# Patient Record
Sex: MALE | Race: WHITE | NOT HISPANIC OR LATINO | Employment: FULL TIME | URBAN - METROPOLITAN AREA
[De-identification: names, ages, dates, MRNs, and addresses within clinical notes are randomized per-mention and may not be internally consistent; named-entity substitution may affect disease eponyms.]

---

## 2018-01-07 ENCOUNTER — APPOINTMENT (OUTPATIENT)
Dept: GENERAL RADIOLOGY | Facility: HOSPITAL | Age: 23
End: 2018-01-07

## 2018-01-07 ENCOUNTER — HOSPITAL ENCOUNTER (EMERGENCY)
Facility: HOSPITAL | Age: 23
Discharge: HOME | End: 2018-01-07
Attending: EMERGENCY MEDICINE | Admitting: EMERGENCY MEDICINE

## 2018-01-07 VITALS
HEIGHT: 70 IN | BODY MASS INDEX: 28.63 KG/M2 | RESPIRATION RATE: 16 BRPM | TEMPERATURE: 98.5 F | DIASTOLIC BLOOD PRESSURE: 75 MMHG | SYSTOLIC BLOOD PRESSURE: 132 MMHG | HEART RATE: 90 BPM | OXYGEN SATURATION: 99 % | WEIGHT: 200 LBS

## 2018-01-07 DIAGNOSIS — S93.402A SPRAIN OF LEFT ANKLE, UNSPECIFIED LIGAMENT, INITIAL ENCOUNTER: Primary | ICD-10-CM

## 2018-01-07 PROCEDURE — 99283 EMERGENCY DEPT VISIT LOW MDM: CPT

## 2018-01-07 PROCEDURE — 73610 X-RAY EXAM OF ANKLE: CPT

## 2018-01-07 PROCEDURE — 73590 X-RAY EXAM OF LOWER LEG: CPT

## 2018-01-07 RX ORDER — IBUPROFEN 600 MG/1
600 TABLET ORAL EVERY 6 HOURS PRN
Qty: 24 TABLET | Refills: 0 | Status: SHIPPED | OUTPATIENT
Start: 2018-01-07

## 2018-01-07 RX ORDER — IBUPROFEN 800 MG/1
800 TABLET ORAL ONCE
Status: COMPLETED | OUTPATIENT
Start: 2018-01-07 | End: 2018-01-07

## 2018-01-07 RX ADMIN — IBUPROFEN 800 MG: 800 TABLET ORAL at 13:41

## 2018-01-07 NOTE — ED NOTES
Patient was jumping on a trampoline at [employer] and her rolled his left ankle. Today he is unable to put any weight on the left foot without pain and ankle is more swollen. Patient has positive pedal pulse and can move toes fine and brisk cap refill to left foot. Denies numbness or tingling.      Rosi Goldsmith RN  01/07/18 7400

## 2018-01-07 NOTE — ED PROVIDER NOTES
" EMERGENCY DEPARTMENT ENCOUNTER    CHIEF COMPLAINT  Chief Complaint: Left ankle injury  History given by: Patient  History limited by: Nothing  Room Number: 52/52  PMD: No Known Provider      HPI:  Pt is a 22 y.o. male who presents complaining of left ankle injury onset last night. The pt states he was jumping on a trampoline last night when he rolled his left ankle inwards. The pt states he has had pain since the injury occurred. The pt states he has been unable to bear weight due to the pain. The pt states he took Aleve at 2200 last night with no improvement of his pain.      Duration: Since the injury occurred last night  Onset: Sudden  Timing: Constant  Location: Left ankle  Radiation: None  Quality: \"Pain\"  Intensity/Severity: Moderate  Progression: Unchanged  Associated Symptoms: None stated  Aggravating Factors: Bearing weight  Alleviating Factors: None stated  Previous Episodes: None  Treatment before arrival: The pt states he took Aleve at 2200 last night with no improvement of his pain.    PAST MEDICAL HISTORY  Active Ambulatory Problems     Diagnosis Date Noted   • No Active Ambulatory Problems     Resolved Ambulatory Problems     Diagnosis Date Noted   • No Resolved Ambulatory Problems     No Additional Past Medical History       PAST SURGICAL HISTORY  History reviewed. No pertinent surgical history.    FAMILY HISTORY  History reviewed. No pertinent family history.    SOCIAL HISTORY  Social History     Social History   • Marital status: Single     Spouse name: N/A   • Number of children: N/A   • Years of education: N/A     Occupational History   • Not on file.     Social History Main Topics   • Smoking status: Never Smoker   • Smokeless tobacco: Never Used   • Alcohol use No      Comment: occ   • Drug use: Not on file   • Sexual activity: Not on file     Other Topics Concern   • Not on file     Social History Narrative   • No narrative on file       ALLERGIES  Review of patient's allergies indicates no " known allergies.    REVIEW OF SYSTEMS  Review of Systems   Constitutional: Negative for chills and fever.   Respiratory: Negative for cough and shortness of breath.    Cardiovascular: Negative for chest pain and palpitations.   Gastrointestinal: Negative for abdominal pain and vomiting.   Genitourinary: Negative for difficulty urinating and dysuria.   Musculoskeletal: Positive for arthralgias (Left ankle). Negative for back pain.   Neurological: Negative for syncope, weakness and numbness.   All other systems reviewed and are negative.      PHYSICAL EXAM  ED Triage Vitals   Temp Heart Rate Resp BP SpO2   01/07/18 1216 01/07/18 1203 01/07/18 1203 01/07/18 1216 01/07/18 1203   98.5 °F (36.9 °C) 84 16 132/75 99 %      Temp src Heart Rate Source Patient Position BP Location FiO2 (%)   01/07/18 1216 01/07/18 1203 -- -- --   Oral Monitor          Physical Exam   Constitutional: He is oriented to person, place, and time. He appears distressed.   HENT:   Head: Normocephalic and atraumatic.   Eyes: EOM are normal.   Neck: Normal range of motion.   Cardiovascular: Normal rate, regular rhythm and normal heart sounds.    No murmur heard.  Pulses:       Dorsalis pedis pulses are 2+ on the right side, and 2+ on the left side.        Posterior tibial pulses are 2+ on the right side, and 2+ on the left side.   Pulmonary/Chest: Effort normal and breath sounds normal. No respiratory distress. He has no wheezes.   Abdominal: Soft. Bowel sounds are normal. There is no tenderness. There is no rebound and no guarding.   Musculoskeletal: Normal range of motion. He exhibits no edema.        Left ankle: He exhibits swelling and ecchymosis. He exhibits no deformity, no laceration and normal pulse. Tenderness. Lateral malleolus and medial malleolus tenderness found. No head of 5th metatarsal and no proximal fibula tenderness found. Achilles tendon exhibits no pain and no defect.   The pt has no proximal fibular tenderness. The pt's achilles  is intact. The pt has a negative anterior drawer test.   Neurological: He is alert and oriented to person, place, and time.   The pt has normal sensation to the distal left foot.   Skin: Skin is warm and dry.   Psychiatric: Affect normal.   Nursing note and vitals reviewed.      RADIOLOGY  XR Ankle 3+ View Left - Negative for fx   XR Tibia Fibula 2 View Left - Negative for fx        I ordered the above noted radiological studies. Interpreted by radiologist. Reviewed by me in PACS.       PROCEDURES  Procedures      PROGRESS AND CONSULTS  ED Course     1221  XR Left Ankle and XR Left Tib Fib ordered for further evaluation.    1310  Upon initial encounter, I informed the pt that I was unable to view a fx on the pt's XR. I informed the pt that I am going to wait for the official radiology dictation to make sure the radiologist does not view a fx. Discussed the plan to discharge the pt. I advised the pt to use elevation and ice to help with the pain. I advised the pt to ice the ankle at least 3 times a day. I informed the pt that his pain should improve over the next several weeks, and if it does not, he will need to f/u to get additional imaging. The pt understands and agrees with the plan.      MEDICAL DECISION MAKING  Results were reviewed/discussed with the patient and they were also made aware of online access. Pt also made aware that some labs, such as cultures, will not be resulted during ER visit and follow up with PMD is necessary.     MDM  Number of Diagnoses or Management Options  Sprain of left ankle, unspecified ligament, initial encounter:      Amount and/or Complexity of Data Reviewed  Tests in the radiology section of CPT®: ordered and reviewed (XR Ankle 3+ View Left - Negative for fx  XR Tibia Fibula 2 View Left - Negative for fx  )    Patient Progress  Patient progress: stable         DIAGNOSIS  Final diagnoses:   Sprain of left ankle, unspecified ligament, initial encounter        DISPOSITION  DISCHARGE    Patient discharged in stable condition.    Reviewed implications of results, diagnosis, meds, responsibility to follow up, warning signs and symptoms of possible worsening, potential complications and reasons to return to ER.    Patient/Family voiced understanding of above instructions.    Discussed plan for discharge, as there is no emergent indication for admission.  Pt/family is agreeable and understands need for follow up and repeat testing.  Pt is aware that discharge does not mean that nothing is wrong but it indicates no emergency is present that requires admission and they must continue care with follow-up as given below or physician of their choice.     FOLLOW-UP  Percy Cade MD  [address] [id number] [phone]    Schedule an appointment as soon as possible for a visit in 2 weeks  As needed         Medication List      New Prescriptions          ibuprofen 600 MG tablet   Commonly known as:  ADVIL,MOTRIN   Take 1 tablet by mouth Every 6 (Six) Hours As Needed for Mild Pain  or   Moderate Pain  (take with food).         Stop          cyclobenzaprine 10 MG tablet   Commonly known as:  FLEXERIL               Latest Documented Vital Signs:  As of 1:31 PM  BP- 132/75 HR- 90 Temp- 98.5 °F (36.9 °C) (Oral) O2 sat- 99%    --  Documentation assistance provided by kai Moore for Dr. Aiken.  Information recorded by the scribe was done at my direction and has been verified and validated by me.       Jesse Mooer  01/07/18 1330       Cristine Aiken MD  01/09/18 [id number]

## 2018-01-07 NOTE — DISCHARGE INSTRUCTIONS
You are advised to follow closely with Dr. Cade or orthopedist of your choice for persistent ankle pain not rapidly improving in 2 weeks for recheck, final results of imaging testing, and further testing/treatment as needed.      Ice, elevate left ankle at least 3 times daily especially over the next 2 days.  Use crutches as needed.  Weightbearing as tolerated.  Use air cast as needed for comfort    Please return to the emergency department immediately with chest pain different than usual for you, shortness of air, abdominal pain, persistent vomiting/fever, blood in emesis or stool, lightheadedness/fainting, problems with speech, one sided weakness/numbness, new incontinence, problems with vision,  or for worsening of symptoms or other concerns.

## 2018-01-12 ENCOUNTER — TELEPHONE (OUTPATIENT)
Dept: ORTHOPEDIC SURGERY | Facility: CLINIC | Age: 23
End: 2018-01-12

## 2018-01-15 ENCOUNTER — OFFICE VISIT (OUTPATIENT)
Dept: ORTHOPEDIC SURGERY | Facility: CLINIC | Age: 23
End: 2018-01-15

## 2018-01-15 VITALS — HEIGHT: 72 IN | WEIGHT: 199.4 LBS | TEMPERATURE: 99.2 F | BODY MASS INDEX: 27.01 KG/M2

## 2018-01-15 DIAGNOSIS — S93.402A SPRAIN OF LEFT ANKLE, UNSPECIFIED LIGAMENT, INITIAL ENCOUNTER: ICD-10-CM

## 2018-01-15 DIAGNOSIS — S86.302A INJURY OF PERONEAL TENDON OF LEFT FOOT, INITIAL ENCOUNTER: Primary | ICD-10-CM

## 2018-01-15 PROCEDURE — 99204 OFFICE O/P NEW MOD 45 MIN: CPT | Performed by: ORTHOPAEDIC SURGERY

## 2018-01-15 NOTE — PROGRESS NOTES
New Patient Complaint      Patient: Donovan Mitchell  YOB: 1995 22 y.o. male  Medical Record Number: [id number]    Chief Complaints: I hurt my ankle     History of Present Illness:   Patient injured his left ankle at a trampoline facility on 1/6/18 twisted inverting the ankle.  He had some sharp pops in the left ankle with medial onset of pain swelling and some bruising with difficulty bearing weight.  He was seen in the emergency room the next day where x-rays were made he was told there was no fracture is placed into an air stirrup and provided with crutches.  He has since stopped using the crutches and is using some type of neoprene ankle wrap.  He still complains of moderate constant stabbing aching pain with bruising and swelling over the inferomedial and dorsal lateral aspects and posterior lateral aspects left ankle worse with walking and standing improved some with anti-inflammatories ice and rest.  Prior to this he had no trouble with the ankle.  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      HPI    Allergies: No Known Allergies    Medications:   Current Outpatient Prescriptions on File Prior to Visit   Medication Sig   • ibuprofen (ADVIL,MOTRIN) 600 MG tablet Take 1 tablet by mouth Every 6 (Six) Hours As Needed for Mild Pain  or Moderate Pain  (take with food).   • cyclobenzaprine (FLEXERIL) 10 MG tablet Take 1 tablet by mouth 3 (Three) Times a Day As Needed for Muscle Spasms.     No current facility-administered medications on file prior to visit.        History reviewed. No pertinent past medical history.  History reviewed. No pertinent surgical history.  Social History     Occupational History   • Not on file.     Social History Main Topics   • Smoking status: Never Smoker   • Smokeless tobacco: Never Used   • Alcohol use No      Comment: occ   • Drug use: No   • Sexual activity: Defer      Social History     Social History Narrative     History reviewed. No pertinent family  "history.    Review of Systems: 14 point review of systems performed, positive pertinent findings identified in HPI. All remaining systems negative     Review of Systems      Physical Exam:   Vitals:    01/15/18 1145   Temp: 99.2 °F (37.3 °C)   TempSrc: Temporal Artery    Weight: 90.4 kg (199 lb 6.4 oz)   Height: 181.6 cm (71.5\")     Physical Exam   Constitutional: pleasant, well developed   Eyes: sclera non icteric  Hearing : adequate for exam  Cardiovascular: palpable pulses in left foot, left calf/ thigh NT without sign of DVT  Respiratoy: breathing unlabored   Neurological: grossly sensate to LT throughout left LE  Psychiatric: oriented with normal mood and affect.   Lymphatic: No palpable popliteal lymphadenopathy left LE  Skin: intact throughout left leg/foot  Musculoskeletal: Moderate swelling about the left ankle.  He was tender to palpation of the medial malleolus and deltoid as well as along the posterior tib tendon.  There is discomfort over the anterolateral ligamentous structures and along the peroneal tendon.  There was discomfort with resisted eversion but no gross subluxation but he was somewhat weak.  There was minimal if any discomfort over the syndesmosis with external rotation testing and no focal pain with proximal fibular compression testing except over the medial aspect of the ankle.  He was nontender over the dorsum of the midfoot he was able to flex and extend his toes well.  Cannot perform a good stability exam due to guarding and swelling.  Physical Exam  Ortho Exam    Radiology: 3 views left ankle and 2 views of the left tib-fib reviewed on the [facility] system from 1/7/18.  Did not see any obvious fracture but there was a questionable area over the posterior malleolus on one view.  Overall talus appears well seated within the mortise    Assessment/Plan: 1.  Left ankle injury with possible posterior malleolar fracture, possible peroneal tendon injury or medial ligamentous injury.    He was " fitted with a tall boot today he will do partial weightbearing only with crutches.  He may sleep in his air stirrup.  Encouraged gentle range of motion exercises to the ankle.    We'll keep him off work for now.  We need to get an MRI of his left ankle to evaluate for posterior malleolar fracture or peroneal tendon injury.    He understands to call to schedule follow-up appointment once MRI has been scheduled.

## 2025-03-22 ENCOUNTER — NURSE TRIAGE (OUTPATIENT)
Dept: CALL CENTER | Facility: HOSPITAL | Age: 30
End: 2025-03-22
Payer: COMMERCIAL

## 2025-03-22 ENCOUNTER — APPOINTMENT (OUTPATIENT)
Dept: ULTRASOUND IMAGING | Facility: HOSPITAL | Age: 30
End: 2025-03-22
Payer: COMMERCIAL

## 2025-03-22 ENCOUNTER — APPOINTMENT (OUTPATIENT)
Dept: CT IMAGING | Facility: HOSPITAL | Age: 30
End: 2025-03-22
Payer: COMMERCIAL

## 2025-03-22 ENCOUNTER — HOSPITAL ENCOUNTER (EMERGENCY)
Facility: HOSPITAL | Age: 30
Discharge: HOME | End: 2025-03-22
Attending: EMERGENCY MEDICINE
Payer: COMMERCIAL

## 2025-03-22 VITALS
SYSTOLIC BLOOD PRESSURE: 128 MMHG | DIASTOLIC BLOOD PRESSURE: 77 MMHG | RESPIRATION RATE: 16 BRPM | OXYGEN SATURATION: 98 % | TEMPERATURE: 99.9 F | HEART RATE: 93 BPM

## 2025-03-22 DIAGNOSIS — N49.2 SCROTAL INFECTION: ICD-10-CM

## 2025-03-22 DIAGNOSIS — Z09 FOLLOW-UP EXAM: Primary | ICD-10-CM

## 2025-03-22 LAB
ALBUMIN SERPL-MCNC: 4.4 G/DL (ref 3.5–5.2)
ALBUMIN/GLOB SERPL: 1.5 G/DL
ALP SERPL-CCNC: 61 U/L (ref 39–117)
ALT SERPL W P-5'-P-CCNC: 20 U/L (ref 1–41)
ANION GAP SERPL CALCULATED.3IONS-SCNC: 12.8 MMOL/L (ref 5–15)
AST SERPL-CCNC: 22 U/L (ref 1–40)
BILIRUB SERPL-MCNC: 0.5 MG/DL (ref 0–1.2)
BILIRUB UR QL STRIP: NEGATIVE
BUN SERPL-MCNC: 16 MG/DL (ref 6–20)
BUN/CREAT SERPL: 13.2 (ref 7–25)
C TRACH RRNA CVX QL NAA+PROBE: NOT DETECTED
CALCIUM SPEC-SCNC: 9.2 MG/DL (ref 8.6–10.5)
CHLORIDE SERPL-SCNC: 101 MMOL/L (ref 98–107)
CLARITY UR: ABNORMAL
CO2 SERPL-SCNC: 23.2 MMOL/L (ref 22–29)
COLOR UR: YELLOW
CREAT SERPL-MCNC: 1.21 MG/DL (ref 0.76–1.27)
D-LACTATE SERPL-SCNC: 1.1 MMOL/L (ref 0.5–2)
DEPRECATED RDW RBC AUTO: 41.2 FL (ref 37–54)
EGFRCR SERPLBLD CKD-EPI 2021: 83.6 ML/MIN/1.73
ERYTHROCYTE [DISTWIDTH] IN BLOOD BY AUTOMATED COUNT: 12.3 % (ref 12.3–15.4)
GLOBULIN UR ELPH-MCNC: 2.9 GM/DL
GLUCOSE SERPL-MCNC: 124 MG/DL (ref 65–99)
GLUCOSE UR STRIP-MCNC: NEGATIVE MG/DL
HCT VFR BLD AUTO: 42 % (ref 37.5–51)
HGB BLD-MCNC: 13.9 G/DL (ref 13–17.7)
HGB UR QL STRIP.AUTO: NEGATIVE
INR PPP: 1.3 (ref 0.9–1.1)
KETONES UR QL STRIP: ABNORMAL
LEUKOCYTE ESTERASE UR QL STRIP.AUTO: NEGATIVE
LYMPHOCYTES # BLD MANUAL: 1.03 10*3/MM3 (ref 0.7–3.1)
LYMPHOCYTES NFR BLD MANUAL: 5 % (ref 5–12)
MAGNESIUM SERPL-MCNC: 1.9 MG/DL (ref 1.6–2.6)
MCH RBC QN AUTO: 30.5 PG (ref 26.6–33)
MCHC RBC AUTO-ENTMCNC: 33.1 G/DL (ref 31.5–35.7)
MCV RBC AUTO: 92.1 FL (ref 79–97)
MONOCYTES # BLD: 0.47 10*3/MM3 (ref 0.1–0.9)
N GONORRHOEA RRNA SPEC QL NAA+PROBE: NOT DETECTED
NEUTROPHILS # BLD AUTO: 7.85 10*3/MM3 (ref 1.7–7)
NEUTROPHILS NFR BLD MANUAL: 84 % (ref 42.7–76)
NITRITE UR QL STRIP: NEGATIVE
PH UR STRIP.AUTO: 7.5 [PH] (ref 5–8)
PLAT MORPH BLD: NORMAL
PLATELET # BLD AUTO: 211 10*3/MM3 (ref 140–450)
PMV BLD AUTO: 10.3 FL (ref 6–12)
POTASSIUM SERPL-SCNC: 3.8 MMOL/L (ref 3.5–5.2)
PROCALCITONIN SERPL-MCNC: 0.25 NG/ML (ref 0–0.25)
PROT SERPL-MCNC: 7.3 G/DL (ref 6–8.5)
PROT UR QL STRIP: ABNORMAL
PROTHROMBIN TIME: 16.2 SECONDS (ref 11.7–14.2)
RBC # BLD AUTO: 4.56 10*6/MM3 (ref 4.14–5.8)
RBC MORPH BLD: NORMAL
SODIUM SERPL-SCNC: 137 MMOL/L (ref 136–145)
SP GR UR STRIP: 1.02 (ref 1–1.03)
UROBILINOGEN UR QL STRIP: ABNORMAL
VARIANT LYMPHS NFR BLD MANUAL: 11 % (ref 19.6–45.3)
WBC MORPH BLD: NORMAL
WBC NRBC COR # BLD AUTO: 9.35 10*3/MM3 (ref 3.4–10.8)

## 2025-03-22 PROCEDURE — 87491 CHLMYD TRACH DNA AMP PROBE: CPT | Performed by: EMERGENCY MEDICINE

## 2025-03-22 PROCEDURE — 80053 COMPREHEN METABOLIC PANEL: CPT | Performed by: EMERGENCY MEDICINE

## 2025-03-22 PROCEDURE — 72193 CT PELVIS W/DYE: CPT

## 2025-03-22 PROCEDURE — 76870 US EXAM SCROTUM: CPT

## 2025-03-22 PROCEDURE — 36415 COLL VENOUS BLD VENIPUNCTURE: CPT

## 2025-03-22 PROCEDURE — 84145 PROCALCITONIN (PCT): CPT | Performed by: EMERGENCY MEDICINE

## 2025-03-22 PROCEDURE — 83735 ASSAY OF MAGNESIUM: CPT | Performed by: EMERGENCY MEDICINE

## 2025-03-22 PROCEDURE — 25510000001 IOPAMIDOL 61 % SOLUTION: Performed by: EMERGENCY MEDICINE

## 2025-03-22 PROCEDURE — 99285 EMERGENCY DEPT VISIT HI MDM: CPT

## 2025-03-22 PROCEDURE — 85025 COMPLETE CBC W/AUTO DIFF WBC: CPT | Performed by: EMERGENCY MEDICINE

## 2025-03-22 PROCEDURE — 25010000002 CEFTRIAXONE PER 250 MG: Performed by: EMERGENCY MEDICINE

## 2025-03-22 PROCEDURE — 93976 VASCULAR STUDY: CPT

## 2025-03-22 PROCEDURE — 87040 BLOOD CULTURE FOR BACTERIA: CPT | Performed by: EMERGENCY MEDICINE

## 2025-03-22 PROCEDURE — 85610 PROTHROMBIN TIME: CPT | Performed by: EMERGENCY MEDICINE

## 2025-03-22 PROCEDURE — 87591 N.GONORRHOEAE DNA AMP PROB: CPT | Performed by: EMERGENCY MEDICINE

## 2025-03-22 PROCEDURE — 81003 URINALYSIS AUTO W/O SCOPE: CPT | Performed by: EMERGENCY MEDICINE

## 2025-03-22 PROCEDURE — 85007 BL SMEAR W/DIFF WBC COUNT: CPT | Performed by: EMERGENCY MEDICINE

## 2025-03-22 PROCEDURE — 83605 ASSAY OF LACTIC ACID: CPT | Performed by: EMERGENCY MEDICINE

## 2025-03-22 PROCEDURE — 96365 THER/PROPH/DIAG IV INF INIT: CPT

## 2025-03-22 RX ORDER — SODIUM CHLORIDE 0.9 % (FLUSH) 0.9 %
10 SYRINGE (ML) INJECTION AS NEEDED
Status: DISCONTINUED | OUTPATIENT
Start: 2025-03-22 | End: 2025-03-22 | Stop reason: HOSPADM

## 2025-03-22 RX ORDER — IOPAMIDOL 612 MG/ML
85 INJECTION, SOLUTION INTRAVASCULAR
Status: COMPLETED | OUTPATIENT
Start: 2025-03-22 | End: 2025-03-22

## 2025-03-22 RX ADMIN — CEFTRIAXONE 2000 MG: 2 INJECTION, POWDER, FOR SOLUTION INTRAMUSCULAR; INTRAVENOUS at 12:56

## 2025-03-22 RX ADMIN — IOPAMIDOL 85 ML: 612 INJECTION, SOLUTION INTRAVENOUS at 13:06

## 2025-03-22 NOTE — ED PROVIDER NOTES
EMERGENCY DEPARTMENT ENCOUNTER    Room Number:  29/29  Date of encounter:  3/22/2025  PCP: Provider, No Known  Historian: Patient  Relevant information and history provided by sources other than the patient will be included below and in the ED Course.  Review of pertinent past medical records may also be included in record below and ED Course.    HPI:  Chief Complaint: Testicular swelling and tenderness  A complete HPI/ROS/PMH/PSH/SH/FH are unobtainable due to: Not applicable  Context: Donovan Mitchell is a 29 y.o. male who presents to the ED c/o patient has noticed since yesterday some swelling and discomfort to his testicle mainly on the left side of his testicle he noticed some discomfort.  But his scrotum seems swollen.  He is monogamous and has been monogamous with 1 person for 5 years.  Denies any dysuria denies any urethral discharge.  He has had a history of varicose veins that was diagnosed about 10 years ago.  No history of similar episodes like this in the past.  He felt like he had some subjective fevers and chills last night.  Denies any coughs or colds or runny nose or nasal drainage.  Denies any abdominal pain or flank pain.  Denies chest pain or shortness of breath.  He is not immunocompromise.        Previous Episodes: No  Current Symptoms: See above    MEDICAL HISTORY REVIEWED  Healthy male.  No chronic medical problems.      PAST MEDICAL HISTORY  Active Ambulatory Problems     Diagnosis Date Noted    Injury of peroneal tendon of left foot 01/15/2018    Sprain of left ankle 01/15/2018     Resolved Ambulatory Problems     Diagnosis Date Noted    No Resolved Ambulatory Problems     No Additional Past Medical History         PAST SURGICAL HISTORY  No past surgical history on file.      FAMILY HISTORY  Family History   Problem Relation Age of Onset    No Known Problems Mother     No Known Problems Father     Diabetes Maternal Grandmother          SOCIAL HISTORY  Social History     Socioeconomic History     Marital status: Single   Tobacco Use    Smoking status: Never    Smokeless tobacco: Never   Substance and Sexual Activity    Alcohol use: No     Comment: occ    Drug use: No    Sexual activity: Defer         ALLERGIES  Patient has no known allergies.        REVIEW OF SYSTEMS  Review of Systems     All systems reviewed and negative except for those discussed in HPI.       PHYSICAL EXAM    I have reviewed the triage vital signs and nursing notes.    ED Triage Vitals   Temp Heart Rate Resp BP SpO2   03/22/25 0941 03/22/25 0941 03/22/25 0941 03/22/25 0943 03/22/25 0941   99.9 °F (37.7 °C) (!) 131 16 149/73 100 %      Temp src Heart Rate Source Patient Position BP Location FiO2 (%)   -- -- -- -- --              GENERAL: Young male.  Does not appear septic or toxic.  No acute distress.Vital signs on my initial evaluation have been reviewed.  He has a normal heart rate oxygen saturation 100% on room air he is afebrile here.  Which was 99.9 °F  HENT: nares patent  Head/neck/ face are symmetric without gross deformity, signs of trauma, or swelling  EYES: no scleral icterus, no conjunctival pallor.  NECK: Supple, no meningismus  CV: regular rhythm, regular rate with intact distal pulses.  RESPIRATORY: normal effort and no respiratory distress.  ABDOMEN: soft and nontender.  GENITOURINARY: Normal phallus. Bilaterally descended testes without masses.  No hernia.    His scrotum appears diffusely swollen mildly.  Does have some mild erythema has some tenderness to his left testicle.  There is no urethral discharge.  There is no fluctuance.  This patient does  shave his scrotum as well as genitalia.  No urethral discharge.       MUSCULOSKELETAL: no deformity.  Distal pulses that are equal strong and symmetric.  No rash.  No edema.  NEURO: alert and appropriate, moves all extremities, follows commands.  No focal motor or sensory changes  SKIN: warm, dry    Vital signs and nursing notes reviewed.        LAB RESULTS  Recent  Results (from the past 24 hours)   Comprehensive Metabolic Panel    Collection Time: 03/22/25 10:07 AM    Specimen: Blood   Result Value Ref Range    Glucose 124 (H) 65 - 99 mg/dL    BUN 16 6 - 20 mg/dL    Creatinine 1.21 0.76 - 1.27 mg/dL    Sodium 137 136 - 145 mmol/L    Potassium 3.8 3.5 - 5.2 mmol/L    Chloride 101 98 - 107 mmol/L    CO2 23.2 22.0 - 29.0 mmol/L    Calcium 9.2 8.6 - 10.5 mg/dL    Total Protein 7.3 6.0 - 8.5 g/dL    Albumin 4.4 3.5 - 5.2 g/dL    ALT (SGPT) 20 1 - 41 U/L    AST (SGOT) 22 1 - 40 U/L    Alkaline Phosphatase 61 39 - 117 U/L    Total Bilirubin 0.5 0.0 - 1.2 mg/dL    Globulin 2.9 gm/dL    A/G Ratio 1.5 g/dL    BUN/Creatinine Ratio 13.2 7.0 - 25.0    Anion Gap 12.8 5.0 - 15.0 mmol/L    eGFR 83.6 >60.0 mL/min/1.73   Protime-INR    Collection Time: 03/22/25 10:07 AM    Specimen: Blood   Result Value Ref Range    Protime 16.2 (H) 11.7 - 14.2 Seconds    INR 1.30 (H) 0.90 - 1.10   Procalcitonin    Collection Time: 03/22/25 10:07 AM    Specimen: Blood   Result Value Ref Range    Procalcitonin 0.25 0.00 - 0.25 ng/mL   Lactic Acid, Plasma    Collection Time: 03/22/25 10:07 AM    Specimen: Blood   Result Value Ref Range    Lactate 1.1 0.5 - 2.0 mmol/L   Magnesium    Collection Time: 03/22/25 10:07 AM    Specimen: Blood   Result Value Ref Range    Magnesium 1.9 1.6 - 2.6 mg/dL   CBC Auto Differential    Collection Time: 03/22/25 10:07 AM    Specimen: Blood   Result Value Ref Range    WBC 9.35 3.40 - 10.80 10*3/mm3    RBC 4.56 4.14 - 5.80 10*6/mm3    Hemoglobin 13.9 13.0 - 17.7 g/dL    Hematocrit 42.0 37.5 - 51.0 %    MCV 92.1 79.0 - 97.0 fL    MCH 30.5 26.6 - 33.0 pg    MCHC 33.1 31.5 - 35.7 g/dL    RDW 12.3 12.3 - 15.4 %    RDW-SD 41.2 37.0 - 54.0 fl    MPV 10.3 6.0 - 12.0 fL    Platelets 211 140 - 450 10*3/mm3   Manual Differential    Collection Time: 03/22/25 10:07 AM    Specimen: Blood   Result Value Ref Range    Neutrophil % 84.0 (H) 42.7 - 76.0 %    Lymphocyte % 11.0 (L) 19.6 - 45.3 %     Monocyte % 5.0 5.0 - 12.0 %    Neutrophils Absolute 7.85 (H) 1.70 - 7.00 10*3/mm3    Lymphocytes Absolute 1.03 0.70 - 3.10 10*3/mm3    Monocytes Absolute 0.47 0.10 - 0.90 10*3/mm3    RBC Morphology Normal Normal    WBC Morphology Normal Normal    Platelet Morphology Normal Normal   Urinalysis With Microscopic If Indicated (No Culture) - Urine, Clean Catch    Collection Time: 03/22/25 11:09 AM    Specimen: Urine, Clean Catch   Result Value Ref Range    Color, UA Yellow Yellow, Straw    Appearance, UA Cloudy (A) Clear    pH, UA 7.5 5.0 - 8.0    Specific Gravity, UA 1.023 1.005 - 1.030    Glucose, UA Negative Negative    Ketones, UA Trace (A) Negative    Bilirubin, UA Negative Negative    Blood, UA Negative Negative    Protein, UA Trace (A) Negative    Leuk Esterase, UA Negative Negative    Nitrite, UA Negative Negative    Urobilinogen, UA 1.0 E.U./dL 0.2 - 1.0 E.U./dL   Chlamydia trachomatis, Neisseria gonorrhoeae, PCR - Swab, Urine, Clean Catch    Collection Time: 03/22/25 11:44 AM    Specimen: Urine, Clean Catch; Swab   Result Value Ref Range    Chlamydia DNA by PCR Not Detected Not Detected    Neisseria gonorrhoeae by PCR Not Detected Not Detected       Ordered the above labs and independently reviewed the results.        RADIOLOGY  CT Pelvis With Contrast  Result Date: 3/22/2025  CT PELVIS W CONTRAST-  HISTORY: Assess for scrotal abscess. Likely scrotal cellulitis.  TECHNIQUE: Radiation dose reduction techniques were utilized, including automated exposure control and exposure modulation based on body size. 3 mm images were obtained through the pelvis after the administration of IV contrast.  COMPARISON: Same-day testicular ultrasound.   FINDINGS: Scrotal skin thickening and small bilateral hydroceles (series 2/image 97). No appreciable organized fluid collection or soft tissue air/gas. Mildly enlarged bilateral inguinal lymph nodes measuring up to 1 cm bilaterally (series 2/image 63).  Normal bladder, prostate and  visualized bowel. Normal nondilated appendix (series 3/image 38). No focal osseous abnormality.        Scrotal skin thickening and small bilateral hydroceles. No appreciable organized fluid collection or soft tissue air/gas. Recommend correlation for findings to suggest infection. Mildly enlarged bilateral lymph nodes are likely reactive/inflammatory.  This report was finalized on 3/22/2025 2:55 PM by Dr. Nahid Jimenez M.D on Workstation: [id number]      US Scrotum & Testicles with doppler  Result Date: 3/22/2025  SCROTAL SONOGRAM WITH SPECTRAL DOPPLER EVALUATION  COMPARISON:  None  HISTORY: Testicular swelling and discomfort with left-sided predominance  TECHNIQUE: Grayscale, color and spectral Doppler images of the testicles were obtained.  FINDINGS:   The right testicle measures 4.3 x 2.3 x 2.6 cm.  The left testicle measures 3.7 x 2.8 x 2.3 cm.   The testicular parenchyma has a normal echotexture.  The epididymides are unremarkable.  Normal arterial and venous waveforms are present in the testes.  Small right-sided hydrocele.  Bilateral scrotal skin thickening measuring up to 0.6 cm bilaterally with mildly increased color Doppler flow.  Dilation of the pampiniform plexus of veins on the left with Valsalva measuring up to 4 mm.  Left inguinal lymph nodes measuring up to 1.1 cm in short axis. Right inguinal lymph nodes measuring up to 1 cm in short axis with remeasurement. Associated lymph nodes have some cortical thickening.        1. Normal sonographic appearance of the testes. 2. Bilateral scrotal skin thickening with mildly increased flow. Recommend correlation with exam to evaluate for findings to suggest soft tissue infection. 3. Left-sided varicocele. 4. Small right-sided hydrocele. 5. Mildly enlarged bilateral inguinal lymph nodes. Recommend a follow-up CT abdomen pelvis in 3 to 6 months to ensure stability or resolution.   This report was finalized on 3/22/2025 12:22 PM by Dr. Nahid Jimenez M.D on  Workstation: [id number]        I ordered the above noted radiological studies. Reviewed by me and discussed with radiologist.  See dictation for official radiology interpretation.      PROCEDURES    Procedures      MEDICATIONS GIVEN IN ER    Medications   sodium chloride 0.9 % flush 10 mL (has no administration in time range)   cefTRIAXone (ROCEPHIN) 2,000 mg in sodium chloride 0.9 % 100 mL MBP (0 mg Intravenous Stopped 3/22/25 1513)   iopamidol (ISOVUE-300) 61 % injection 85 mL (85 mL Intravenous Given 3/22/25 1306)         All labs have been independently reviewed by me.  All radiology studies have been reviewed by me and I discussed with radiologist dictating the report when indicated below.  All EKG's independently viewed and interpreted by me.  Discussion below represents my analysis of pertinent findings related to patient's condition, differential diagnosis, treatment plan and final disposition.        PROGRESS, DATA ANALYSIS, CONSULTS, AND MEDICAL DECISION MAKING    This is a gentleman that has some subjective fevers.  His temperature here is 99.9 and he has some testicular tenderness with swelling and erythema.  We will check some lab work and do a testicular ultrasound.  He does not want anything for pain at this time.  Informed him of my clinical concerns and the test that we will order.  All questions answered at this time.      ED Course as of 03/22/25 1549   Sat Mar 22, 2025   1110 Procalcitonin: 0.25 [MM]   1110 Lactate: 1.1 [MM]   1110 WBC: 9.35 [MM]   1227 Patient has normal sonographic appearance of the testes.  There is some bilateral scrotal skin thickening with mildly increased flow concerning for potential soft tissue infection.  There is a left-sided varicocele.  Small right-sided hydrocele mildly enlarged bilateral inguinal lymph nodes.  No signs of testicular torsion or other abnormality.  I reviewed the report from radiologist.  Please see complete dictated report. [MM]   1244 I did discuss  the case with the urologist Dr. Hills.  He recommends doing a CT scan with contrast.  He wants to make sure there is not any deep small abscess or gas seen in the scrotum.  Reviewed the images and lab work.  Agrees with a dose of Rocephin here.  If the CT scan is unremarkable does not show any signs other than signs consistent with cellulitis the plan will be discharged home and he will see him in the office on Monday or Tuesday.  Will discharge him on Augmentin. [MM]   1255 I went back and reevaluated this patient.  I informed him about my conversation with the urologist the results of the ultrasound and lab work.  Informed about the plan of doing a CT scan to make sure he does not have a small abscess.  If the CT scan is okay we will discharge him and follow-up with urologist.  We Argun to give him some antibiotics here.  All questions answered.  Patient agrees with that plan. [MM]   1514 I reviewed the CT scan report from radiologist.  There is some scrotal skin thickening and small bilateral hydroceles but there is no appreciable organized fluid collection or soft tissue air or gas.  Mildly enlarged bilateral lymph nodes.  Please see complete dictated report from radiologist [MM]   1519 Talked with the patient at length about the results of the CT scan and lab work.  He is feeling better.  Agrees with the treatment plan.  All questions answered [MM]      ED Course User Index  [MM] Christian Montoya MD       AS OF 15:49 EDT VITALS:    BP - 128/77  HR - 93  TEMP - 99.9 °F (37.7 °C)  02 SATS - 98%    SOCIAL DETERMINANTS OF HEALTH THAT IMPACT OR LIMIT CARE (For example..Homelessness,safe discharge, inability to obtain care, follow up, or prescriptions):      DIAGNOSIS  Final diagnoses:   Scrotal infection: Scrotal cellulitis         DISPOSITION  DISCHARGE    Patient discharged in stable condition.    Reviewed implications of results, diagnosis, meds, responsibility to follow up, warning signs and symptoms of  possible worsening, potential complications and reasons to return to ER, including worsening of symptoms, worsening of pain, spiking fevers, worsening of swelling, or any other concerns..    Patient/Family voiced understanding of above instructions.    Discussed plan for discharge, as there is no emergent indication for admission. Pt/family is agreeable and understands need for follow up and repeat testing.  Pt is aware that discharge does not mean that nothing is wrong but it indicates no emergency is present that requires admission and they must continue care with follow-up as given below or physician of their choice.     FOLLOW-UP  Abdiel Hills MD  [address] [address]  [phone]      Call Monday morning to make sure you follow-up either Monday or Tuesday.  Return if you are spiking fevers, worsening of pain, worsening of swelling, or any other concerns.         Medication List        New Prescriptions      * amoxicillin-clavulanate 875-125 MG per tablet  Commonly known as: AUGMENTIN  Take 1 tablet by mouth 2 (Two) Times a Day for 7 days.     * amoxicillin-clavulanate 875-125 MG per tablet  Commonly known as: AUGMENTIN  Take 1 tablet by mouth 2 (Two) Times a Day for 7 days.           * This list has 2 medication(s) that are the same as other medications prescribed for you. Read the directions carefully, and ask your doctor or other care provider to review them with you.                   Where to Get Your Medications        These medications were sent to [facility] #[id number] - [facility] [address] [facility] - [phone] [facility] [phone] [address] [address]      Phone: [phone]   amoxicillin-clavulanate 875-125 MG per tablet       These medications were sent to [facility] #[id number] - [place], [place] - [address] [facility] [place] AT [facility] [address] [place] [address] - [phone]  -  [phone] FX  [address] [facility] [address]      Phone: [phone]   amoxicillin-clavulanate 875-125 MG per tablet                 DICTATED UTILIZING DRAGON DICTATION    Note Disclaimer: At [facility], we believe that sharing information builds trust and better relationships. You are receiving this note because you recently visited [facility]. It is possible you will see health information before a provider has talked with you about it. This kind of information can be easy to misunderstand. To help you fully understand what it means for your health, we urge you to discuss this note with your provider.       Christian Montoya MD  03/22/25 6515

## 2025-03-22 NOTE — ED NOTES
Pt arrives for swelling and pain in his scrotum. Pt also reports body aches and headache. Swelling started last night. Pt denies dysuria

## 2025-03-22 NOTE — DISCHARGE INSTRUCTIONS
Urine and blood cultures are pending.  Take antibiotics as prescribed.  Make sure you follow-up with urologist in the early part of next week.  Return if you spike fevers, worsening of pain, or any other concerns.

## 2025-03-22 NOTE — TELEPHONE ENCOUNTER
amoxicillin-clavulanate (AUGMENTIN) 875-125 MG per tablet [[id number]]    Order Details  Dose: 1 tablet Route: Oral Frequency: 2 Times Daily   Dispense Quantity: 14 tablet Refills: 0          Sig: Take 1 tablet by mouth 2 (Two) Times a Day for 7 days.         Start Date: 03/22/25 End Date: 03/29/25 after 14 doses   Written Date: 03/22/25 Expiration Date: 03/22/26   Providers      Ordering Provider and Authorizing Provider:  Christian Montoya MD  [address] [place] [id number]  Phone: [phone]  NPI: [id number]        Ordering User: Christian Montoya MD    Sent to [address] as patient requested.   Reason for Disposition  • [1] Prescription prescribed recently is not at pharmacy AND [2] triager has access to patient's EMR AND [3] prescription is recorded in the EMR    Additional Information  • Negative: [1] Intentional drug overdose AND [2] suicidal thoughts or ideas  • Negative: Drug overdose and triager unable to answer question  • Negative: Caller requesting a renewal or refill of a medicine patient is currently taking  • Negative: Caller requesting information unrelated to medicine  • Negative: Caller requesting information about COVID-19 Vaccine  • Negative: Caller requesting information about Emergency Contraception  • Negative: Caller requesting information about Combined Birth Control Pills  • Negative: Caller requesting information about Progestin Birth Control Pills  • Negative: Caller requesting information about Post-Op pain or medicines  • Negative: Caller requesting a prescription antibiotic (such as Penicillin) for Strep throat and has a positive culture result  • Negative: Caller requesting a prescription anti-viral med (such as Tamiflu) and has influenza (flu) symptoms  • Negative: Immunization reaction suspected  • Negative: Rash while taking a medicine or within 3 days of stopping it  • Negative: [1] Asthma and [2] having symptoms of asthma (cough, wheezing,  etc.)  • Negative: [1] Symptom of illness (e.g., headache, abdominal pain, earache, vomiting) AND [2] more than mild  • Negative: Breastfeeding questions about mother's medicines and diet  • Negative: MORE THAN A DOUBLE DOSE of a prescription or over-the-counter (OTC) drug  • Negative: [1] DOUBLE DOSE (an extra dose or lesser amount) of prescription drug AND [2] any symptoms (e.g., dizziness, nausea, pain, sleepiness)  • Negative: [1] DOUBLE DOSE (an extra dose or lesser amount) of over-the-counter (OTC) drug AND [2] any symptoms (e.g., dizziness, nausea, pain, sleepiness)  • Negative: Took another person's prescription drug  • Negative: [1] DOUBLE DOSE (an extra dose or lesser amount) of prescription drug AND [2] NO symptoms  (Exception: A double dose of antibiotics.)  • Negative: Diabetes drug error or overdose (e.g., took wrong type of insulin or took extra dose)  • Negative: [1] Prescription not at pharmacy AND [2] was prescribed by PCP recently (Exception: Triager has access to EMR and prescription is recorded there. Go to Home Care and confirm for pharmacy.)  • Negative: [1] Pharmacy calling with prescription question AND [2] triager unable to answer question  • Negative: [1] Caller has URGENT medicine question about med that PCP or specialist prescribed AND [2] triager unable to answer question  • Negative: Medicine patch causing local rash or itching  • Negative: [1] Caller has medicine question about med NOT prescribed by PCP AND [2] triager unable to answer question (e.g., compatibility with other med, storage)  • Negative: Prescription request for new medicine (not a refill)  • Negative: [1] Caller has NON-URGENT medicine question about med that PCP prescribed AND [2] triager unable to answer question  • Negative: Caller wants to use a complementary or alternative medicine  • Negative: [1] DOUBLE DOSE (an extra dose or lesser amount) of over-the-counter (OTC) drug AND [2] NO symptoms  • Negative: [1]  "DOUBLE DOSE (an extra dose or lesser amount) of antibiotic drug AND [2] NO symptoms    Answer Assessment - Initial Assessment Questions  1. NAME of MEDICINE: \"What medicine(s) are you calling about?\"      Augmentin  2. QUESTION: \"What is your question?\" (e.g., double dose of medicine, side effect)      Send to pharmacy that is open  3. PRESCRIBER: \"Who prescribed the medicine?\" Reason: if prescribed by specialist, call should be referred to that group.      Dr Montoya  4. SYMPTOMS: \"Do you have any symptoms?\" If Yes, ask: \"What symptoms are you having?\"  \"How bad are the symptoms (e.g., mild, moderate, severe)      Scrotal cellulitis  5. PREGNANCY:  \"Is there any chance that you are pregnant?\" \"When was your last menstrual period?\"      na    Protocols used: Medication Question Call-ADULT-    "

## 2025-03-27 LAB
BACTERIA SPEC AEROBE CULT: NORMAL
BACTERIA SPEC AEROBE CULT: NORMAL